# Patient Record
Sex: FEMALE | Race: WHITE | NOT HISPANIC OR LATINO | Employment: PART TIME | URBAN - METROPOLITAN AREA
[De-identification: names, ages, dates, MRNs, and addresses within clinical notes are randomized per-mention and may not be internally consistent; named-entity substitution may affect disease eponyms.]

---

## 2017-01-16 ENCOUNTER — ALLIED HEALTH/NURSE VISIT (OUTPATIENT)
Dept: FAMILY MEDICINE | Facility: CLINIC | Age: 32
End: 2017-01-16
Payer: COMMERCIAL

## 2017-01-16 DIAGNOSIS — Z23 NEED FOR PROPHYLACTIC VACCINATION AND INOCULATION AGAINST INFLUENZA: Primary | ICD-10-CM

## 2017-01-16 PROCEDURE — 99207 ZZC NO CHARGE NURSE ONLY: CPT

## 2017-01-16 PROCEDURE — 90686 IIV4 VACC NO PRSV 0.5 ML IM: CPT

## 2017-01-16 PROCEDURE — 90471 IMMUNIZATION ADMIN: CPT

## 2017-01-16 NOTE — PROGRESS NOTES
Injectable Influenza Immunization Documentation    1.  Is the person to be vaccinated sick today?  No    2. Does the person to be vaccinated have an allergy to eggs or to a component of the vaccine?  No    3. Has the person to be vaccinated today ever had a serious reaction to influenza vaccine in the past?  No    4. Has the person to be vaccinated ever had Guillain-Matamoras syndrome?  No     Form completed by Manny Contreras CMA

## 2017-07-07 ENCOUNTER — TELEPHONE (OUTPATIENT)
Dept: FAMILY MEDICINE | Facility: CLINIC | Age: 32
End: 2017-07-07

## 2017-07-07 NOTE — TELEPHONE ENCOUNTER
S-(situation): Pt describes that she was applying an anti-aging product 2 days ago when she got some in her eye.  Her eye was burning and stinging afterward.  However, yesterday, her eye watered all day so badly that she could not see well enough to drive and her eye was light-sensitive.  Today, her eye is worse.  The eye is red and swollen.  It is a throbbing pain now.  The eye is more light-sensitive today and is draining increased amounts of watery drainage.    B-(background): per above.    A-(assessment): chemical eye injury    R-(recommendations): Advised eye dr asap.  Pt will arrange.  Warned that this is her eye/vision and important that this is addressed asap today.  Mary Chandra RN

## 2017-10-02 DIAGNOSIS — F41.1 GAD (GENERALIZED ANXIETY DISORDER): ICD-10-CM

## 2017-10-02 RX ORDER — BUPROPION HYDROCHLORIDE 150 MG/1
150 TABLET ORAL EVERY MORNING
Qty: 30 TABLET | Refills: 0 | Status: SHIPPED | OUTPATIENT
Start: 2017-10-02 | End: 2017-11-27

## 2017-10-02 NOTE — TELEPHONE ENCOUNTER
buPROPion (WELLBUTRIN XL) 150 MG 24 hr tablet       Last Written Prescription Date: 8/25/16  Last Fill Quantity: 90; # refills: 3  Last Office Visit with [facility], P or [facility] prescribing provider:  10/19/16        Last PHQ-9 score on record=   PHQ-9 SCORE 10/19/2016   Total Score 6       No results found for: AST  No results found for: ALT

## 2017-11-06 DIAGNOSIS — F41.1 GAD (GENERALIZED ANXIETY DISORDER): ICD-10-CM

## 2017-11-08 NOTE — TELEPHONE ENCOUNTER
Routing refill request to provider for review/approval because:  Patient needs to be seen because it has been more than 1 year since last office visit.  Pato Hart RN

## 2017-11-14 RX ORDER — BUPROPION HYDROCHLORIDE 150 MG/1
TABLET ORAL
Qty: 30 TABLET | Refills: 0 | OUTPATIENT
Start: 2017-11-14

## 2017-11-27 ENCOUNTER — OFFICE VISIT (OUTPATIENT)
Dept: FAMILY MEDICINE | Facility: CLINIC | Age: 32
End: 2017-11-27
Payer: COMMERCIAL

## 2017-11-27 VITALS
HEIGHT: 69 IN | WEIGHT: 137.9 LBS | SYSTOLIC BLOOD PRESSURE: 114 MMHG | DIASTOLIC BLOOD PRESSURE: 79 MMHG | TEMPERATURE: 98.7 F | HEART RATE: 90 BPM | BODY MASS INDEX: 20.42 KG/M2

## 2017-11-27 DIAGNOSIS — F41.1 GAD (GENERALIZED ANXIETY DISORDER): ICD-10-CM

## 2017-11-27 DIAGNOSIS — F32.81 PMDD (PREMENSTRUAL DYSPHORIC DISORDER): ICD-10-CM

## 2017-11-27 DIAGNOSIS — Z30.9 ENCOUNTER FOR CONTRACEPTIVE MANAGEMENT, UNSPECIFIED TYPE: ICD-10-CM

## 2017-11-27 DIAGNOSIS — N92.0 EXCESSIVE OR FREQUENT MENSTRUATION: Primary | ICD-10-CM

## 2017-11-27 DIAGNOSIS — Z12.4 SCREENING FOR CERVICAL CANCER: ICD-10-CM

## 2017-11-27 DIAGNOSIS — N89.8 VAGINAL DISCHARGE: ICD-10-CM

## 2017-11-27 LAB
SPECIMEN SOURCE: NORMAL
WET PREP SPEC: NORMAL

## 2017-11-27 PROCEDURE — 87624 HPV HI-RISK TYP POOLED RSLT: CPT | Performed by: PHYSICIAN ASSISTANT

## 2017-11-27 PROCEDURE — 87210 SMEAR WET MOUNT SALINE/INK: CPT | Performed by: PHYSICIAN ASSISTANT

## 2017-11-27 PROCEDURE — G0145 SCR C/V CYTO,THINLAYER,RESCR: HCPCS | Performed by: PHYSICIAN ASSISTANT

## 2017-11-27 PROCEDURE — 99214 OFFICE O/P EST MOD 30 MIN: CPT | Performed by: PHYSICIAN ASSISTANT

## 2017-11-27 RX ORDER — BUPROPION HYDROCHLORIDE 150 MG/1
150 TABLET ORAL EVERY MORNING
Qty: 90 TABLET | Refills: 1 | Status: SHIPPED | OUTPATIENT
Start: 2017-11-27 | End: 2018-06-13

## 2017-11-27 RX ORDER — SERTRALINE HYDROCHLORIDE 100 MG/1
150 TABLET, FILM COATED ORAL DAILY
Qty: 135 TABLET | Refills: 1 | Status: SHIPPED | OUTPATIENT
Start: 2017-11-27 | End: 2018-06-13

## 2017-11-27 ASSESSMENT — ANXIETY QUESTIONNAIRES
7. FEELING AFRAID AS IF SOMETHING AWFUL MIGHT HAPPEN: NOT AT ALL
3. WORRYING TOO MUCH ABOUT DIFFERENT THINGS: SEVERAL DAYS
5. BEING SO RESTLESS THAT IT IS HARD TO SIT STILL: SEVERAL DAYS
2. NOT BEING ABLE TO STOP OR CONTROL WORRYING: SEVERAL DAYS
1. FEELING NERVOUS, ANXIOUS, OR ON EDGE: SEVERAL DAYS
GAD7 TOTAL SCORE: 7
6. BECOMING EASILY ANNOYED OR IRRITABLE: MORE THAN HALF THE DAYS

## 2017-11-27 ASSESSMENT — PATIENT HEALTH QUESTIONNAIRE - PHQ9
SUM OF ALL RESPONSES TO PHQ QUESTIONS 1-9: 7
5. POOR APPETITE OR OVEREATING: SEVERAL DAYS

## 2017-11-27 NOTE — PATIENT INSTRUCTIONS
Increase zoloft to 150 mg daily  Continue wellbutrin 150 mg daily  Make appointment for OBGYN to discuss birth control/period control options  Let me know how the medication changes go

## 2017-11-27 NOTE — LETTER
December 3, 2017    Jeremiah Madsen  [address] [facility] [address] [address]    Dear Jeremiah,  We are happy to inform you that your PAP smear result from 11/27/17 is normal.  We are now able to do a follow up test on PAP smears. The DNA test is for HPV (Human Papilloma Virus). Cervical cancer is closely linked with certain types of HPV. Your result showed no evidence of high risk HPV.  Therefore we recommend you return in 3 years for your next pap smear and HPV test.  You will still need to return to the clinic every year for an annual exam and other preventive tests.  Please contact the clinic at [phone] with any questions.  Sincerely,    Sheba Balderas PA-C/abena

## 2017-11-27 NOTE — PROGRESS NOTES
SUBJECTIVE:                                                    Jeremiah Madsen is a 32 year old female who presents to clinic today for the following health issues:    Medication Followup of Zoloft and Wellbutrin    Taking Medication as prescribed: yes    Side Effects:  None    Medication Helping Symptoms:  Talk about different options zoloft she does take 100 mg daily but during periods she takes 150mg.       * she is okay doing the PAP smear.  Has had paragard IUD almost 6 years  Has heavy periods and bad PMS  She feels her main issue is PMDD, increasing zoloft to 150 mg period week does not help because she has worsening mood/anxiety the week before too.  Her periods are regular but are heavier with paragard  She is not ready for permanent contraception    Hypertension: no  Smoking/tobacco use: no  History of cancer: no  History of heart problems or blood clots: no  History of migraines with aura: no  Other PMH: none  LMP: 11/13/17  STD testing: declined      Problem list and histories reviewed & adjusted, as indicated.  Additional history: none    Patient Active Problem List   Diagnosis     BLAZE (generalized anxiety disorder)     PMDD (premenstrual dysphoric disorder)     Past Surgical History:   Procedure Laterality Date     breast lift/implants  09/2014       Social History   Substance Use Topics     Smoking status: Never Smoker     Smokeless tobacco: Never Used     Alcohol use 0.0 oz/week     0 Standard drinks or equivalent per week      Comment: occasional      Family History   Problem Relation Age of Onset     OSTEOPOROSIS Mother      Depression Father      CANCER Father      CANCER Maternal Grandmother      CANCER Maternal Grandfather      CEREBROVASCULAR DISEASE Maternal Grandfather      CANCER Paternal Grandmother      Colon Cancer Paternal Grandmother      Depression Brother              ROS:Other than noted above, general, HEENT, respiratory, cardiac, MS, and gastrointestinal systems are negative.  "    OBJECTIVE:                                                    /79  Pulse 90  Temp 98.7  F (37.1  C) (Tympanic)  Ht 5' 9\" (1.753 m)  Wt 137 lb 14.4 oz (62.6 kg)  LMP 11/13/2017 (Exact Date)  BMI 20.36 kg/m2 Body mass index is 20.36 kg/(m^2).   GENERAL: healthy, alert, well nourished, well hydrated, no distress  RESP: lungs clear to auscultation - no rales, no rhonchi, no wheezes  CV: regular rates and rhythm, normal S1 S2, no S3 or S4 and no murmur, no click or rub -  ABDOMEN: soft, no tenderness, no  hepatosplenomegaly, no masses, normal bowel sounds  - female: cervix- normal, adnexae- normal; uterus- normal, no masses, mild whitish discharge. IUD strings visualized  PSYCH: Alert and oriented times 3; speech- coherent , normal rate and volume; able to articulate logical thoughts, able to abstract reason, no tangential thoughts, no hallucinations or delusions, affect- normal       ASSESSMENT/PLAN:                                                      ASSESSMENT/PLAN:      ICD-10-CM    1. Excessive or frequent menstruation N92.0 OB/GYN REFERRAL   2. Encounter for contraceptive management, unspecified type Z30.9 OB/GYN REFERRAL   3. BLAZE (generalized anxiety disorder) F41.1 sertraline (ZOLOFT) 100 MG tablet     buPROPion (WELLBUTRIN XL) 150 MG 24 hr tablet   4. PMDD (premenstrual dysphoric disorder) F32.81 OB/GYN REFERRAL   5. Screening for cervical cancer Z12.4 Pap imaged thin layer screen with HPV - recommended age 30 - 65 years (select HPV order below)   6. Vaginal discharge N89.8 Wet prep     She would like to discuss options with OBGYN; have paragard removed, possible mirena    Patient Instructions   Increase zoloft to 150 mg daily  Continue wellbutrin 150 mg daily  Make appointment for OBGYN to discuss birth control/period control options  Let me know how the medication changes go    Sheba Balderas PA-C   [facility]    "

## 2017-11-27 NOTE — MR AVS SNAPSHOT
After Visit Summary   11/27/2017    Jeremiah Madsen    MRN: [id number]           Patient Information     Date Of Birth          1985        Visit Information        Provider Department      11/27/2017 1:40 PM Sheba Balderas PA-C [facility]        Today's Diagnoses     Excessive or frequent menstruation    -  1    Encounter for contraceptive management, unspecified type        BLAZE (generalized anxiety disorder)        PMDD (premenstrual dysphoric disorder)          Care Instructions    Increase zoloft to 150 mg daily  Continue wellbutrin 150 mg daily  Make appointment for OBGYN to discuss birth control/period control options  Let me know how the medication changes go          Follow-ups after your visit        Additional Services     OB/GYN REFERRAL       Your provider has referred you to:  [facility]: [facility] [place] [phone]   [email or web address]    Please be aware that coverage of these services is subject to the terms and limitations of your health insurance plan.  Call member services at your health plan with any benefit or coverage questions.      Please bring the following with you to your appointment:    (1) Any X-Rays, CTs or MRIs which have been performed.  Contact the facility where they were done to arrange for  prior to your scheduled appointment.   (2) List of current medications   (3) This referral request   (4) Any documents/labs given to you for this referral                  Who to contact     Normal or non-critical lab and imaging results will be communicated to you by MyChart, letter or phone within 4 business days after the clinic has received the results. If you do not hear from us within 7 days, please contact the clinic through MyChart or phone. If you have a critical or abnormal lab result, we will notify you by phone as soon as possible.  Submit refill requests through [employer] or call your pharmacy and they will  "forward the refill request to us. Please allow 3 business days for your refill to be completed.          If you need to speak with a  for additional information , please call: [phone]             Additional Information About Your Visit        [employer] Information     [employer] lets you send messages to your doctor, view your test results, renew your prescriptions, schedule appointments and more. To sign up, go to [email or web address] . Click on \"Log in\" on the left side of the screen, which will take you to the Welcome page. Then click on \"Sign up Now\" on the right side of the page.     You will be asked to enter the access code listed below, as well as some personal information. Please follow the directions to create your username and password.     Your access code is: [email or web address]  Expires: 2018  2:25 PM     Your access code will  in 90 days. If you need help or a new code, please call your [facility] clinic or [phone].        Care EveryWhere ID     This is your Care EveryWhere ID. This could be used by other organizations to access your [facility] medical records  [id number]        Your Vitals Were     Pulse Temperature Height Last Period BMI (Body Mass Index)       90 98.7  F (37.1  C) (Tympanic) 5' 9\" (1.753 m) 2017 (Exact Date) 20.36 kg/m2        Blood Pressure from Last 3 Encounters:   17 114/79   10/19/16 114/77   16 131/68    Weight from Last 3 Encounters:   17 137 lb 14.4 oz (62.6 kg)   10/19/16 132 lb 9.6 oz (60.1 kg)   16 135 lb 11.2 oz (61.6 kg)              We Performed the Following     OB/GYN REFERRAL          Today's Medication Changes          These changes are accurate as of: 17  2:25 PM.  If you have any questions, ask your nurse or doctor.               These medicines have changed or have updated prescriptions.        Dose/Directions    buPROPion 150 MG 24 hr tablet   Commonly known as:  WELLBUTRIN XL   This may have " changed:  additional instructions   Used for:  BLAZE (generalized anxiety disorder)   Changed by:  Sheba Balderas PA-C        Dose:  150 mg   Take 1 tablet (150 mg) by mouth every morning   Quantity:  90 tablet   Refills:  1       * sertraline 100 MG tablet   Commonly known as:  ZOLOFT   This may have changed:  Another medication with the same name was added. Make sure you understand how and when to take each.   Used for:  BLAZE (generalized anxiety disorder)   Changed by:  Maddie Cho MD        Take 1 tablet (100 mg) by mouth daily - for one week out of the month, take 150mg ( PMDD). Due for follow up in clinic   Quantity:  135 tablet   Refills:  0       * sertraline 100 MG tablet   Commonly known as:  ZOLOFT   This may have changed:  You were already taking a medication with the same name, and this prescription was added. Make sure you understand how and when to take each.   Used for:  BLAZE (generalized anxiety disorder)   Changed by:  Sheba Balderas PA-C        Dose:  150 mg   Take 1.5 tablets (150 mg) by mouth daily   Quantity:  135 tablet   Refills:  1       * Notice:  This list has 2 medication(s) that are the same as other medications prescribed for you. Read the directions carefully, and ask your doctor or other care provider to review them with you.         Where to get your medicines      These medications were sent to [id number] - [place], [place] - [address] [place] [address] AT [facility] [address]  [address] [place] [address], [place] [address]     Phone:  [phone]     buPROPion 150 MG 24 hr tablet    sertraline 100 MG tablet                Primary Care Provider Office Phone # Fax #    Sheba Balderas PA-C [phone] [id number] [facility] [address]        Equal Access to Services     ULI PLASCENCIA AH: Jak Vincent, chnia castrejon, jayantta kaaljulian gomez, ying lomas. So [facility]  [phone].    ATENCIÓN: Si aracelis hernandez, tiene a doran disposición servicios gratuitos de asistencia lingüística. Yamilka ya [phone].    We comply with applicable federal civil rights laws and [place] laws. We do not discriminate on the basis of race, color, national origin, age, disability, sex, sexual orientation, or gender identity.            Thank you!     Thank you for choosing [facility]  for your care. Our goal is always to provide you with excellent care. Hearing back from our patients is one way we can continue to improve our services. Please take a few minutes to complete the written survey that you may receive in the mail after your visit with us. Thank you!             Your Updated Medication List - Protect others around you: Learn how to safely use, store and throw away your medicines at www.disposemymeds.org.          This list is accurate as of: 11/27/17  2:25 PM.  Always use your most recent med list.                   Brand Name Dispense Instructions for use Diagnosis    buPROPion 150 MG 24 hr tablet    WELLBUTRIN XL    90 tablet    Take 1 tablet (150 mg) by mouth every morning    BLAZE (generalized anxiety disorder)       hydrOXYzine 10 MG tablet    ATARAX    60 tablet    Take 1-3 tablets (10-30 mg) by mouth 3 times daily as needed for itching or anxiety    BLAZE (generalized anxiety disorder)       paragard intrauterine copper     1 each    1 each by Intrauterine route once        * sertraline 100 MG tablet    ZOLOFT    135 tablet    Take 1 tablet (100 mg) by mouth daily - for one week out of the month, take 150mg ( PMDD). Due for follow up in clinic    BLAZE (generalized anxiety disorder)       * sertraline 100 MG tablet    ZOLOFT    135 tablet    Take 1.5 tablets (150 mg) by mouth daily    BLAZE (generalized anxiety disorder)       * Notice:  This list has 2 medication(s) that are the same as other medications prescribed for you. Read the directions carefully, and ask your doctor or  other care provider to review them with you.

## 2017-11-28 ASSESSMENT — ANXIETY QUESTIONNAIRES: GAD7 TOTAL SCORE: 7

## 2017-11-29 LAB
COPATH REPORT: NORMAL
PAP: NORMAL

## 2017-12-01 LAB
FINAL DIAGNOSIS: NORMAL
HPV HR 12 DNA CVX QL NAA+PROBE: NEGATIVE
HPV16 DNA SPEC QL NAA+PROBE: NEGATIVE
HPV18 DNA SPEC QL NAA+PROBE: NEGATIVE
SPECIMEN DESCRIPTION: NORMAL

## 2018-06-13 DIAGNOSIS — F41.1 GAD (GENERALIZED ANXIETY DISORDER): ICD-10-CM

## 2018-06-13 RX ORDER — SERTRALINE HYDROCHLORIDE 100 MG/1
TABLET, FILM COATED ORAL
Qty: 45 TABLET | Refills: 0 | Status: SHIPPED | OUTPATIENT
Start: 2018-06-13 | End: 2018-07-23

## 2018-06-13 RX ORDER — BUPROPION HYDROCHLORIDE 150 MG/1
TABLET ORAL
Qty: 30 TABLET | Refills: 0 | Status: SHIPPED | OUTPATIENT
Start: 2018-06-13 | End: 2018-07-23

## 2018-06-13 NOTE — TELEPHONE ENCOUNTER
Medication is being filled for 1 time refill only due to:  Patient needs to be seen because needs follow up appt and phq9.   Pato Hart RN

## 2018-07-23 DIAGNOSIS — F41.1 GAD (GENERALIZED ANXIETY DISORDER): ICD-10-CM

## 2018-07-23 NOTE — TELEPHONE ENCOUNTER
"SERTRALINE 100MG TABLETS        Last Written Prescription Date:  6/13/18  Last Fill Quantity: 45,   # refills: 0  Last Office Visit: 11/27/17  Future Office visit:       buPROPion (WELLBUTRIN XL) 150 MG 24 hr tablet      Last Written Prescription Date:  6/13/18  Last Fill Quantity: 30,   # refills: 0  Last Office Visit: 11/27/17  Future Office visit:       Requested Prescriptions   Pending Prescriptions Disp Refills     sertraline (ZOLOFT) 100 MG tablet [Pharmacy Med Name: SERTRALINE 100MG TABLETS] 45 tablet 0     Sig: TAKE 1& 1/2 TABLETS BY MOUTH EVERY DAY    SSRIs Protocol Passed    7/23/2018 10:31 AM       Passed - Recent (12 mo) or future (30 days) visit within the authorizing provider's specialty    Patient had office visit in the last 12 months or has a visit in the next 30 days with authorizing provider or within the authorizing provider's specialty.  See \"Patient Info\" tab in [employer], or \"Choose Columns\" in Meds & Orders section of the refill encounter.           Passed - Medication is Bupropion    If the medication is Bupropion (Wellbutrin), and the patient is taking for smoking cessation; OK to refill.         Passed - Patient is age 18 or older       Passed - No active pregnancy on record       Passed - No positive pregnancy test in last 12 months        buPROPion (WELLBUTRIN XL) 150 MG 24 hr tablet [Pharmacy Med Name: BUPROPION XL 150MG TABLETS (24 H)] 30 tablet 0     Sig: TAKE 1 TABLET BY MOUTH EVERY MORNING    SSRIs Protocol Passed    7/23/2018 10:31 AM       Passed - Recent (12 mo) or future (30 days) visit within the authorizing provider's specialty    Patient had office visit in the last 12 months or has a visit in the next 30 days with authorizing provider or within the authorizing provider's specialty.  See \"Patient Info\" tab in inbasket, or \"Choose Columns\" in Meds & Orders section of the refill encounter.           Passed - Medication is Bupropion    If the medication is Bupropion (Wellbutrin), " and the patient is taking for smoking cessation; OK to refill.         Passed - Patient is age 18 or older       Passed - No active pregnancy on record       Passed - No positive pregnancy test in last 12 months

## 2018-07-24 RX ORDER — BUPROPION HYDROCHLORIDE 150 MG/1
TABLET ORAL
Qty: 30 TABLET | Refills: 3 | Status: SHIPPED | OUTPATIENT
Start: 2018-07-24 | End: 2018-11-26

## 2018-07-24 RX ORDER — SERTRALINE HYDROCHLORIDE 100 MG/1
TABLET, FILM COATED ORAL
Qty: 45 TABLET | Refills: 3 | Status: SHIPPED | OUTPATIENT
Start: 2018-07-24 | End: 2019-01-07

## 2018-08-23 ENCOUNTER — NURSE TRIAGE (OUTPATIENT)
Dept: NURSING | Facility: CLINIC | Age: 33
End: 2018-08-23

## 2018-08-23 NOTE — TELEPHONE ENCOUNTER
Patient has had a cough for about one week.  Patient says she started having a fever since last night.  She woke up sweating.  Current temp is 100.4 axillary.  Reviewed with call guideline and care advice.  Caller verbalizes understanding.         Additional Information    Negative: Difficulty breathing    Negative: [1] Headache AND [2] stiff neck (can't touch chin to chest)    Negative: IV drug abuse    Negative: [1] Drinking very little AND [2] dehydration suspected (e.g., no urine > 12 hours, very dry mouth, very lightheaded)    Negative: Patient sounds very sick or weak to the triager  (Exception: mild weakness and hasn't taken fever medicine)    Negative: Fever > 104 F (40 C)    Negative: [1] Fever > 101 F (38.3 C) AND [2] age > 60    Negative: [1] Fever > 101 F (38.3 C) AND [2] bedridden (e.g., nursing home patient, CVA, chronic illness, recovering from surgery)    Negative: [1] Fever > 100.5 F (38.1 C) AND [2] indwelling urinary catheter (e.g., Pandey, Coude)    Negative: [1] Fever > 100.5 F (38.1 C) AND [2] has port (portacath), central line, or PICC line    Negative: [1] Fever > 100.5 F (38.1 C) AND [2] diabetes mellitus or weak immune system (e.g., HIV positive, splenectomy, chronic steroids)    Negative: [1] Fever > 100.5 F (38.1 C) AND [2] surgery in the last month    Negative: Transplant patient (e.g., kidney, liver, lung, heart)    Negative: Fever present > 3 days (72 hours)    Negative: [1] Fever > 100.5 F (38.1 C) AND [2] foreign travel to a developing country in the last month    Negative: [1] Intermittent fever > 100.5 F (38.1 C) AND [2] lasts > 3 weeks    [1] Fever AND [2] no signs of serious infection or localizing symptoms (all other triage questions negative)    Protocols used: FEVER-ADULT-

## 2018-11-26 DIAGNOSIS — F41.1 GAD (GENERALIZED ANXIETY DISORDER): ICD-10-CM

## 2018-11-26 RX ORDER — BUPROPION HYDROCHLORIDE 150 MG/1
TABLET ORAL
Qty: 30 TABLET | Refills: 0 | Status: SHIPPED | OUTPATIENT
Start: 2018-11-26 | End: 2019-01-07

## 2018-11-26 NOTE — TELEPHONE ENCOUNTER
Medication is being filled for 1 time refill only due to:  Patient needs to be seen because it has been one year.   Pato Hart RN

## 2018-11-26 NOTE — TELEPHONE ENCOUNTER
"BUPROPION XL 150MG TABLETS (24 H)        Last Written Prescription Date:  7/24/18  Last Fill Quantity: 30,   # refills: 3  Last Office Visit: 8/24/18  Future Office visit:       Requested Prescriptions   Pending Prescriptions Disp Refills     buPROPion (WELLBUTRIN XL) 150 MG 24 hr tablet [Pharmacy Med Name: BUPROPION XL 150MG TABLETS (24 H)] 30 tablet 0     Sig: TAKE 1 TABLET BY MOUTH EVERY MORNING    SSRIs Protocol Passed    11/26/2018  3:24 AM       Passed - Recent (12 mo) or future (30 days) visit within the authorizing provider's specialty    Patient had office visit in the last 12 months or has a visit in the next 30 days with authorizing provider or within the authorizing provider's specialty.  See \"Patient Info\" tab in inbasket, or \"Choose Columns\" in Meds & Orders section of the refill encounter.             Passed - Medication is Bupropion    If the medication is Bupropion (Wellbutrin), and the patient is taking for smoking cessation; OK to refill.         Passed - Patient is age 18 or older       Passed - No active pregnancy on record       Passed - No positive pregnancy test in last 12 months          "

## 2019-01-07 DIAGNOSIS — F41.1 GAD (GENERALIZED ANXIETY DISORDER): ICD-10-CM

## 2019-01-07 NOTE — TELEPHONE ENCOUNTER
"BUPROPION XL 150MG TABLETS (24 H)      Last Written Prescription Date:  11/26/18  Last Fill Quantity: 30,   # refills: 0  Last Office Visit: 8/24/18  Future Office visit:       sertraline (ZOLOFT) 100 MG tablet      Last Written Prescription Date:  7/24/18  Last Fill Quantity: 45,   # refills: 3  Last Office Visit: 8/24/18  Future Office visit:       Requested Prescriptions   Pending Prescriptions Disp Refills     buPROPion (WELLBUTRIN XL) 150 MG 24 hr tablet [Pharmacy Med Name: BUPROPION XL 150MG TABLETS (24 H)] 30 tablet 0     Sig: TAKE 1 TABLET BY MOUTH EVERY MORNING    SSRIs Protocol Failed - 1/7/2019  4:00 PM       Failed - Recent (12 mo) or future (30 days) visit within the authorizing provider's specialty    Patient had office visit in the last 12 months or has a visit in the next 30 days with authorizing provider or within the authorizing provider's specialty.  See \"Patient Info\" tab in [employer], or \"Choose Columns\" in Meds & Orders section of the refill encounter.             Passed - Medication is Bupropion    If the medication is Bupropion (Wellbutrin), and the patient is taking for smoking cessation; OK to refill.         Passed - Medication is active on med list       Passed - Patient is age 18 or older       Passed - No active pregnancy on record       Passed - No positive pregnancy test in last 12 months        sertraline (ZOLOFT) 100 MG tablet [Pharmacy Med Name: SERTRALINE 100MG TABLETS] 45 tablet 0     Sig: TAKE 1& 1/2 TABLETS BY MOUTH EVERY DAY    SSRIs Protocol Failed - 1/7/2019  4:00 PM       Failed - Recent (12 mo) or future (30 days) visit within the authorizing provider's specialty    Patient had office visit in the last 12 months or has a visit in the next 30 days with authorizing provider or within the authorizing provider's specialty.  See \"Patient Info\" tab in [employer], or \"Choose Columns\" in Meds & Orders section of the refill encounter.             Passed - Medication is Bupropion    If " the medication is Bupropion (Wellbutrin), and the patient is taking for smoking cessation; OK to refill.         Passed - Medication is active on med list       Passed - Patient is age 18 or older       Passed - No active pregnancy on record       Passed - No positive pregnancy test in last 12 months

## 2019-01-08 NOTE — TELEPHONE ENCOUNTER
Routing refill request to provider for review/approval because:  Savita given x1 and patient did not follow up  Patient needs to be seen because:    PHQ-9 SCORE 8/25/2016 10/19/2016 11/27/2017   PHQ-9 Total Score 5 6 7       Patient needs to be seen because it has been more than 1 year since last office visit. 11/27/17  Pato Hart RN

## 2019-01-09 NOTE — TELEPHONE ENCOUNTER
Message left on patient's personally identified vm to call the clinic at [phone] and make an appointment before further refills can be made.  Pato Hart RN

## 2019-01-09 NOTE — TELEPHONE ENCOUNTER
Please call patient to schedule office visit med check. Okay to refill to get her through to appointment  Sheba Balderas PA-C

## 2019-01-14 RX ORDER — SERTRALINE HYDROCHLORIDE 100 MG/1
TABLET, FILM COATED ORAL
Qty: 11 TABLET | Refills: 0 | Status: SHIPPED | OUTPATIENT
Start: 2019-01-14

## 2019-01-14 RX ORDER — BUPROPION HYDROCHLORIDE 150 MG/1
TABLET ORAL
Qty: 7 TABLET | Refills: 0 | Status: SHIPPED | OUTPATIENT
Start: 2019-01-14 | End: 2023-05-31

## 2020-09-24 ENCOUNTER — TRANSFERRED RECORDS (OUTPATIENT)
Dept: HEALTH INFORMATION MANAGEMENT | Facility: CLINIC | Age: 35
End: 2020-09-24

## 2020-09-24 LAB
CHOLESTEROL (EXTERNAL): 227 MG/DL
HBA1C MFR BLD: 4.7 %
HDLC SERPL-MCNC: 84 MG/DL
HPV ABSTRACT: NORMAL
LDL CHOLESTEROL CALCULATED (EXTERNAL): 125 MG/DL
NON HDL CHOLESTEROL (EXTERNAL): ABNORMAL MG/DL
PAP-ABSTRACT: NORMAL
TRIGLYCERIDES (EXTERNAL): 92 MG/DL

## 2021-02-25 ENCOUNTER — COMMUNICATION - HEALTHEAST (OUTPATIENT)
Dept: HEALTH INFORMATION MANAGEMENT | Facility: CLINIC | Age: 36
End: 2021-02-25

## 2021-06-26 ENCOUNTER — HEALTH MAINTENANCE LETTER (OUTPATIENT)
Age: 36
End: 2021-06-26

## 2021-10-16 ENCOUNTER — HEALTH MAINTENANCE LETTER (OUTPATIENT)
Age: 36
End: 2021-10-16

## 2021-12-30 ENCOUNTER — TRANSFERRED RECORDS (OUTPATIENT)
Dept: HEALTH INFORMATION MANAGEMENT | Facility: CLINIC | Age: 36
End: 2021-12-30

## 2022-05-05 NOTE — TELEPHONE ENCOUNTER
Ordered one week's worth with instructions to make an appt before further refills can be made.  Pato Hart RN     Detail Level: Detailed Quality 130: Documentation Of Current Medications In The Medical Record: Current Medications Documented

## 2022-07-23 ENCOUNTER — HEALTH MAINTENANCE LETTER (OUTPATIENT)
Age: 37
End: 2022-07-23

## 2022-09-22 ENCOUNTER — MYC REFILL (OUTPATIENT)
Dept: FAMILY MEDICINE | Facility: CLINIC | Age: 37
End: 2022-09-22

## 2022-09-22 DIAGNOSIS — F41.1 GAD (GENERALIZED ANXIETY DISORDER): ICD-10-CM

## 2022-09-22 RX ORDER — BUPROPION HYDROCHLORIDE 150 MG/1
150 TABLET ORAL EVERY MORNING
Qty: 7 TABLET | Refills: 0 | OUTPATIENT
Start: 2022-09-22

## 2022-09-25 ENCOUNTER — HEALTH MAINTENANCE LETTER (OUTPATIENT)
Age: 37
End: 2022-09-25

## 2023-02-08 ENCOUNTER — VIRTUAL VISIT (OUTPATIENT)
Dept: FAMILY MEDICINE | Facility: CLINIC | Age: 38
End: 2023-02-08

## 2023-02-08 DIAGNOSIS — F41.1 GENERALIZED ANXIETY DISORDER: Primary | ICD-10-CM

## 2023-02-08 PROCEDURE — 99203 OFFICE O/P NEW LOW 30 MIN: CPT | Mod: VID | Performed by: FAMILY MEDICINE

## 2023-02-08 RX ORDER — DROSPIRENONE AND ETHINYL ESTRADIOL 0.02-3(28)
1 KIT ORAL DAILY
COMMUNITY
End: 2023-03-14

## 2023-02-08 RX ORDER — BUPROPION HYDROCHLORIDE 150 MG/1
150 TABLET ORAL EVERY MORNING
Qty: 30 TABLET | Refills: 0 | Status: SHIPPED | OUTPATIENT
Start: 2023-02-08 | End: 2023-03-14

## 2023-02-08 RX ORDER — SERTRALINE HYDROCHLORIDE 100 MG/1
100 TABLET, FILM COATED ORAL DAILY
Qty: 30 TABLET | Refills: 0 | Status: SHIPPED | OUTPATIENT
Start: 2023-02-08 | End: 2023-02-20

## 2023-02-08 NOTE — PROGRESS NOTES
Greg is a 37 year old who is being evaluated via a billable video visit.      How would you like to obtain your AVS? MyChart  If the video visit is dropped, the invitation should be resent by: Text to cell phone: [phone]  Will anyone else be joining your video visit? No      Assessment & Plan     Generalized anxiety disorder  Patient has had inconsistent follow-up, discussed need for follow-up; including assessing the effect of the medications and symptom assessment, as well as assessing overall health.  We will do 1 month prescription without refill and to follow up in 1 month or sooner with concerns.  - buPROPion (WELLBUTRIN XL) 150 MG 24 hr tablet; Take 1 tablet (150 mg) by mouth every morning  - sertraline (ZOLOFT) 100 MG tablet; Take 1 tablet (100 mg) by mouth daily      Return in about 1 month (around 3/8/2023) for Follow up with PCP.    Glynn Huerta MD  [facility]    Josse Garza is a 37 year old, presenting for the following health issues:  Recheck Medication  Patient wants refill for Zoloft and Wellbutrin.  Reviewed history shows she has not had follow-up for a while.  She says her insurance changed.  Last refill was in September last year for 30 pills of Zoloft; understands Wellbutrin as well although she does not have the bottle to show me.    Bottles:   Zoloft 100 mg for 30 days, no refills  Wellbutrim 150 mg; has no medication bottle at this time    She states she needs the medication because her  states that she is a little on the edge when she does not take the medication.     HPI Review of Systems         Objective           Vitals:  No vitals were obtained today due to virtual visit.    Physical Exam         Video-Visit Details    Type of service:  Video Visit     Originating Location (pt. Location): Home     Distant Location (provider location):  On-site    Platform used for Video Visit: "[employer]

## 2023-02-20 DIAGNOSIS — F41.1 GENERALIZED ANXIETY DISORDER: ICD-10-CM

## 2023-02-20 RX ORDER — SERTRALINE HYDROCHLORIDE 100 MG/1
TABLET, FILM COATED ORAL
Qty: 30 TABLET | Refills: 0 | Status: SHIPPED | OUTPATIENT
Start: 2023-02-20 | End: 2023-03-14

## 2023-03-14 ENCOUNTER — OFFICE VISIT (OUTPATIENT)
Dept: FAMILY MEDICINE | Facility: CLINIC | Age: 38
End: 2023-03-14
Payer: COMMERCIAL

## 2023-03-14 VITALS
DIASTOLIC BLOOD PRESSURE: 86 MMHG | SYSTOLIC BLOOD PRESSURE: 138 MMHG | BODY MASS INDEX: 21.37 KG/M2 | HEIGHT: 69 IN | RESPIRATION RATE: 16 BRPM | WEIGHT: 144.3 LBS | OXYGEN SATURATION: 100 % | HEART RATE: 68 BPM | TEMPERATURE: 97.9 F

## 2023-03-14 DIAGNOSIS — Z12.4 CERVICAL CANCER SCREENING: ICD-10-CM

## 2023-03-14 DIAGNOSIS — Z00.00 ROUTINE GENERAL MEDICAL EXAMINATION AT A HEALTH CARE FACILITY: Primary | ICD-10-CM

## 2023-03-14 DIAGNOSIS — Z12.31 ENCOUNTER FOR SCREENING MAMMOGRAM FOR BREAST CANCER: ICD-10-CM

## 2023-03-14 DIAGNOSIS — F41.1 GENERALIZED ANXIETY DISORDER: ICD-10-CM

## 2023-03-14 DIAGNOSIS — Z13.29 SCREENING FOR THYROID DISORDER: ICD-10-CM

## 2023-03-14 DIAGNOSIS — Z30.011 ENCOUNTER FOR PRESCRIPTION OF ORAL CONTRACEPTIVES: ICD-10-CM

## 2023-03-14 DIAGNOSIS — F32.81 PMDD (PREMENSTRUAL DYSPHORIC DISORDER): ICD-10-CM

## 2023-03-14 LAB
T4 FREE SERPL-MCNC: 0.78 NG/DL (ref 0.9–1.7)
TSH SERPL DL<=0.005 MIU/L-ACNC: 5.28 UIU/ML (ref 0.3–4.2)

## 2023-03-14 PROCEDURE — 90471 IMMUNIZATION ADMIN: CPT

## 2023-03-14 PROCEDURE — 0124A COVID-19 VACCINE BIVALENT BOOSTER 12+ (PFIZER): CPT

## 2023-03-14 PROCEDURE — 84443 ASSAY THYROID STIM HORMONE: CPT

## 2023-03-14 PROCEDURE — 99396 PREV VISIT EST AGE 40-64: CPT | Mod: 25

## 2023-03-14 PROCEDURE — 36415 COLL VENOUS BLD VENIPUNCTURE: CPT

## 2023-03-14 PROCEDURE — 87624 HPV HI-RISK TYP POOLED RSLT: CPT

## 2023-03-14 PROCEDURE — 99214 OFFICE O/P EST MOD 30 MIN: CPT | Mod: 25

## 2023-03-14 PROCEDURE — G0145 SCR C/V CYTO,THINLAYER,RESCR: HCPCS

## 2023-03-14 PROCEDURE — 90715 TDAP VACCINE 7 YRS/> IM: CPT

## 2023-03-14 PROCEDURE — 84439 ASSAY OF FREE THYROXINE: CPT

## 2023-03-14 PROCEDURE — 91312 COVID-19 VACCINE BIVALENT BOOSTER 12+ (PFIZER): CPT

## 2023-03-14 RX ORDER — BUPROPION HYDROCHLORIDE 150 MG/1
150 TABLET ORAL EVERY MORNING
Qty: 90 TABLET | Refills: 3 | Status: SHIPPED | OUTPATIENT
Start: 2023-03-14

## 2023-03-14 RX ORDER — DROSPIRENONE AND ETHINYL ESTRADIOL 0.02-3(28)
1 KIT ORAL DAILY
Qty: 84 TABLET | Refills: 3 | Status: SHIPPED | OUTPATIENT
Start: 2023-03-14 | End: 2023-06-01

## 2023-03-14 RX ORDER — SERTRALINE HYDROCHLORIDE 100 MG/1
100 TABLET, FILM COATED ORAL DAILY
Qty: 90 TABLET | Refills: 3 | Status: SHIPPED | OUTPATIENT
Start: 2023-03-14 | End: 2024-03-20

## 2023-03-14 ASSESSMENT — ANXIETY QUESTIONNAIRES
GAD7 TOTAL SCORE: 7
3. WORRYING TOO MUCH ABOUT DIFFERENT THINGS: MORE THAN HALF THE DAYS
5. BEING SO RESTLESS THAT IT IS HARD TO SIT STILL: NOT AT ALL
6. BECOMING EASILY ANNOYED OR IRRITABLE: SEVERAL DAYS
4. TROUBLE RELAXING: NOT AT ALL
7. FEELING AFRAID AS IF SOMETHING AWFUL MIGHT HAPPEN: MORE THAN HALF THE DAYS
IF YOU CHECKED OFF ANY PROBLEMS ON THIS QUESTIONNAIRE, HOW DIFFICULT HAVE THESE PROBLEMS MADE IT FOR YOU TO DO YOUR WORK, TAKE CARE OF THINGS AT HOME, OR GET ALONG WITH OTHER PEOPLE: SOMEWHAT DIFFICULT
2. NOT BEING ABLE TO STOP OR CONTROL WORRYING: SEVERAL DAYS
1. FEELING NERVOUS, ANXIOUS, OR ON EDGE: SEVERAL DAYS

## 2023-03-14 ASSESSMENT — ENCOUNTER SYMPTOMS
BREAST MASS: 0
NERVOUS/ANXIOUS: 1

## 2023-03-14 NOTE — ASSESSMENT & PLAN NOTE
Works well for patient. Denies any concerns regarding side effects or issues taking medication. Does not desire pregnancy. Normal menstrual cycles after her IUD was removed. Reviewed contraindications for hormonal birth control, including migraine with aura, prolonged immobility, use of tobacco and uncontrolled hypertension, none of which are applicable to patient. Refill provided today.

## 2023-03-14 NOTE — ASSESSMENT & PLAN NOTE
Reports this was a significant problem with her paragard IUD in place. Symptoms are much better managed on COCs.

## 2023-03-14 NOTE — PROGRESS NOTES
SUBJECTIVE:   CC: Greg is an 38 year old who presents for preventive health visit.   Patient has been advised of split billing requirements and indicates understanding: Yes  Healthy Habits:     Getting at least 3 servings of Calcium per day:  Yes    Bi-annual eye exam:  NO    Dental care twice a year:  NO    Sleep apnea or symptoms of sleep apnea:  None    Diet:  Regular (no restrictions)    Frequency of exercise:  2-3 days/week    Duration of exercise:  15-30 minutes    Taking medications regularly:  Yes    Medication side effects:  None    PHQ-2 Total Score: 0    Additional concerns today:  No    Today's PHQ-2 Score:   PHQ-2 ( 1999 [employer]) 3/14/2023   Q1: Little interest or pleasure in doing things 0   Q2: Feeling down, depressed or hopeless 0   PHQ-2 Score 0   Q1: Little interest or pleasure in doing things Not at all   Q2: Feeling down, depressed or hopeless Not at all   PHQ-2 Score 0       Have you ever done Advance Care Planning? (For example, a Health Directive, POLST, or a discussion with a medical provider or your loved ones about your wishes): No, advance care planning information given to patient to review.  Patient declined advance care planning discussion at this time.    Social History     Tobacco Use     Smoking status: Never     Smokeless tobacco: Never   Substance Use Topics     Alcohol use: Yes     Alcohol/week: 0.0 standard drinks     Comment: occasional      Alcohol Use 3/14/2023   Prescreen: >3 drinks/day or >7 drinks/week? Yes   AUDIT SCORE  4     Reviewed orders with patient.  Reviewed health maintenance and updated orders accordingly - Yes  Lab work is in process  Labs reviewed in EPIC  BP Readings from Last 3 Encounters:   03/14/23 138/86   11/27/17 114/79   10/19/16 114/77    Wt Readings from Last 3 Encounters:   03/14/23 65.5 kg (144 lb 4.8 oz)   11/27/17 62.6 kg (137 lb 14.4 oz)   10/19/16 60.1 kg (132 lb 9.6 oz)                  Patient Active Problem List   Diagnosis      Generalized anxiety disorder     PMDD (premenstrual dysphoric disorder)     Routine general medical examination at a health care facility     Encounter for prescription of oral contraceptives     Past Surgical History:   Procedure Laterality Date     breast lift/implants  09/2014       Social History     Tobacco Use     Smoking status: Never     Smokeless tobacco: Never   Substance Use Topics     Alcohol use: Yes     Alcohol/week: 0.0 standard drinks     Comment: occasional      Family History   Problem Relation Age of Onset     Osteoporosis Mother      Depression Father      Skin Cancer Father      Depression Brother      Colon Cancer Maternal Grandmother      Cancer Maternal Grandfather      Cerebrovascular Disease Maternal Grandfather      Cancer Paternal Grandmother      Colon Cancer Paternal Grandmother      Breast Cancer No family hx of      Coronary Artery Disease Early Onset No family hx of      Diabetes No family hx of      Thyroid Disease No family hx of          Current Outpatient Medications   Medication Sig Dispense Refill     buPROPion (WELLBUTRIN XL) 150 MG 24 hr tablet Take 1 tablet (150 mg) by mouth every morning 90 tablet 3     drospirenone-ethinyl estradiol (EVANGELISTA) 3-0.02 MG tablet Take 1 tablet by mouth daily for 90 days 84 tablet 3     sertraline (ZOLOFT) 100 MG tablet Take 1 tablet (100 mg) by mouth daily 90 tablet 3     sertraline (ZOLOFT) 100 MG tablet Take 1 tablet (100 mg) by mouth daily - for one week out of the month, take 150mg ( PMDD). Due for follow up in clinic 135 tablet 0     buPROPion (WELLBUTRIN XL) 150 MG 24 hr tablet TAKE 1 TABLET BY MOUTH EVERY MORNING (Patient not taking: Reported on 3/14/2023) 7 tablet 0     hydrOXYzine (ATARAX) 10 MG tablet Take 1-3 tablets (10-30 mg) by mouth 3 times daily as needed for itching or anxiety (Patient not taking: Reported on 11/27/2017) 60 tablet 1     paragard intrauterine copper 1 each by Intrauterine route once (Patient not taking: Reported on  "2/8/2023) 1 each      sertraline (ZOLOFT) 100 MG tablet TAKE 1& 1/2 TABLETS BY MOUTH EVERY DAY (Patient not taking: Reported on 3/14/2023) 11 tablet 0       Breast Cancer Screening:    FHS-7:   Breast CA Risk Assessment (FHS-7) 3/14/2023   Did any of your first-degree relatives have breast or ovarian cancer? No   Did any of your relatives have bilateral breast cancer? No   Did any woman in your family have breast and ovarian cancer? No   Did any woman in your family have breast cancer before age 50 y? No   Do you have 2 or more relatives with breast and/or ovarian cancer? No   Do you have 2 or more relatives with breast and/or bowel cancer? No       Mammogram Screening - Offered annual screening and updated Health Maintenance for mutual plan based on risk factor consideration    Pertinent mammograms are reviewed under the imaging tab.    History of abnormal Pap smear: NO - age 30-65 PAP every 5 years with negative HPV co-testing recommended  PAP / HPV Latest Ref Rng & Units 11/27/2017   PAP (Historical) - NIL   HPV16 NEG:Negative Negative   HPV18 NEG:Negative Negative   HRHPV NEG:Negative Negative     Reviewed and updated as needed this visit by clinical staff   Tobacco  Allergies  Meds              Reviewed and updated as needed this visit by Provider                 Review of Systems   Breasts:  Negative for tenderness, breast mass and discharge.   Genitourinary: Negative for pelvic pain, vaginal bleeding and vaginal discharge.   Psychiatric/Behavioral: Positive for mood changes. The patient is nervous/anxious.        OBJECTIVE:   /86 (BP Location: Left arm, Patient Position: Sitting, Cuff Size: Adult Regular)   Pulse 68   Temp 97.9  F (36.6  C) (Oral)   Resp 16   Ht 1.753 m (5' 9\")   Wt 65.5 kg (144 lb 4.8 oz)   LMP 03/05/2023 (Approximate)   SpO2 100%   BMI 21.31 kg/m       Physical Exam  GENERAL: healthy, alert and no distress  EYES: Eyes grossly normal to inspection, PERRL and conjunctivae and " sclerae normal  HENT: ear canals and TM's normal, nose and mouth without ulcers or lesions  NECK: no adenopathy, no asymmetry, masses, or scars and thyroid normal to palpation  RESP: lungs clear to auscultation - no rales, rhonchi or wheezes  BREAST: declined by patient. Discussed breast awareness.  CV: regular rate and rhythm, normal S1 S2, no S3 or S4, no murmur, click or rub, no peripheral edema and peripheral pulses strong  ABDOMEN: soft, nontender, no hepatosplenomegaly, no masses and bowel sounds normal  MS: no gross musculoskeletal defects noted, no edema  SKIN: no suspicious lesions or rashes  NEURO: Normal strength and tone, mentation intact and speech normal  PSYCH: mentation appears normal, affect normal/bright      ASSESSMENT/PLAN:     Problem List Items Addressed This Visit        Behavioral    Generalized anxiety disorder     Patient reports good control of symptoms. PHQ-2 was 0. BLAZE-7 was 4. Wellbutrin 150mg and escitalopram 100mg work well. Refills provided today. Plan to reassess in one year or sooner with any changes.          Relevant Medications    sertraline (ZOLOFT) 100 MG tablet    buPROPion (WELLBUTRIN XL) 150 MG 24 hr tablet    PMDD (premenstrual dysphoric disorder)     Reports this was a significant problem with her paragard IUD in place. Symptoms are much better managed on COCs.          Relevant Medications    sertraline (ZOLOFT) 100 MG tablet    buPROPion (WELLBUTRIN XL) 150 MG 24 hr tablet       Other    Routine general medical examination at a health care facility - Primary     Annual exam.  Updated Pap today.  Patient has elected to start breast cancer screening at age 38, so order for mammogram was placed. Does have family history of colon cancer, but diagnosed later in life. Discussed healthy diet and exercise recommendations; patient's BMI is 21. Patient reports she had recent lab work in the past 1-2 years without any abnormalities; she has filled out an OSMIN for these. Would  consider lipids, CBC, CMP next year based on these results but decision was made not to repeat today.         Encounter for prescription of oral contraceptives     Works well for patient. Denies any concerns regarding side effects or issues taking medication. Does not desire pregnancy. Normal menstrual cycles after her IUD was removed. Reviewed contraindications for hormonal birth control, including migraine with aura, prolonged immobility, use of tobacco and uncontrolled hypertension, none of which are applicable to patient. Refill provided today.          Relevant Medications    drospirenone-ethinyl estradiol (EVANGELISTA) 3-0.02 MG tablet   Other Visit Diagnoses     Encounter for screening mammogram for breast cancer        Relevant Orders    MA Screen Bilateral w/Ashok    Cervical cancer screening        Relevant Orders    Pap Screen with HPV - recommended age 30 - 65 years    Screening for thyroid disorder        Relevant Orders    TSH with free T4 reflex          Patient has been advised of split billing requirements and indicates understanding: Yes      COUNSELING:  Reviewed preventive health counseling, as reflected in patient instructions       Regular exercise       Healthy diet/nutrition       Immunizations    Vaccinated for: Covid-19 and TDAP         Osteoporosis prevention/bone health       Consider Hep C screening for all patients one time for ages 18-79 years       HIV screeninx in teen years, 1x in adult years, and at intervals if high risk    She reports that she has never smoked. She has never used smokeless tobacco.    TOM Franco CNP  [facility]

## 2023-03-14 NOTE — ASSESSMENT & PLAN NOTE
Annual exam.  Updated Pap today.  Patient has elected to start breast cancer screening at age 38, so order for mammogram was placed. Does have family history of colon cancer, but diagnosed later in life. Discussed healthy diet and exercise recommendations; patient's BMI is 21. Patient reports she had recent lab work in the past 1-2 years without any abnormalities; she has filled out an OSMIN for these. Would consider lipids, CBC, CMP next year based on these results but decision was made not to repeat today.

## 2023-03-14 NOTE — ASSESSMENT & PLAN NOTE
Patient reports good control of symptoms. PHQ-2 was 0. BLAZE-7 was 4. Wellbutrin 150mg and escitalopram 100mg work well. Refills provided today. Plan to reassess in one year or sooner with any changes.

## 2023-03-16 ENCOUNTER — MYC MEDICAL ADVICE (OUTPATIENT)
Dept: FAMILY MEDICINE | Facility: CLINIC | Age: 38
End: 2023-03-16
Payer: COMMERCIAL

## 2023-03-16 DIAGNOSIS — E03.9 HYPOTHYROIDISM, UNSPECIFIED TYPE: Primary | ICD-10-CM

## 2023-03-16 RX ORDER — LEVOTHYROXINE SODIUM 25 UG/1
25 TABLET ORAL DAILY
Qty: 90 TABLET | Refills: 0 | Status: SHIPPED | OUTPATIENT
Start: 2023-03-16 | End: 2023-06-20

## 2023-03-17 LAB
BKR LAB AP GYN ADEQUACY: NORMAL
BKR LAB AP GYN INTERPRETATION: NORMAL
BKR LAB AP HPV REFLEX: NORMAL
BKR LAB AP LMP: NORMAL
BKR LAB AP PREVIOUS ABNORMAL: NORMAL
PATH REPORT.COMMENTS IMP SPEC: NORMAL
PATH REPORT.COMMENTS IMP SPEC: NORMAL
PATH REPORT.RELEVANT HX SPEC: NORMAL

## 2023-03-21 LAB
HUMAN PAPILLOMA VIRUS 16 DNA: NEGATIVE
HUMAN PAPILLOMA VIRUS 18 DNA: NEGATIVE
HUMAN PAPILLOMA VIRUS FINAL DIAGNOSIS: NORMAL
HUMAN PAPILLOMA VIRUS OTHER HR: NEGATIVE

## 2023-04-07 ENCOUNTER — ANCILLARY PROCEDURE (OUTPATIENT)
Dept: MAMMOGRAPHY | Facility: CLINIC | Age: 38
End: 2023-04-07
Payer: COMMERCIAL

## 2023-04-07 DIAGNOSIS — Z12.31 ENCOUNTER FOR SCREENING MAMMOGRAM FOR BREAST CANCER: ICD-10-CM

## 2023-04-07 PROCEDURE — 77067 SCR MAMMO BI INCL CAD: CPT

## 2023-05-31 ENCOUNTER — MYC REFILL (OUTPATIENT)
Dept: FAMILY MEDICINE | Facility: CLINIC | Age: 38
End: 2023-05-31
Payer: COMMERCIAL

## 2023-05-31 DIAGNOSIS — F41.1 GAD (GENERALIZED ANXIETY DISORDER): ICD-10-CM

## 2023-05-31 RX ORDER — BUPROPION HYDROCHLORIDE 150 MG/1
150 TABLET ORAL EVERY MORNING
Qty: 7 TABLET | Refills: 0 | Status: SHIPPED | OUTPATIENT
Start: 2023-05-31 | End: 2024-04-29

## 2023-05-31 ASSESSMENT — ANXIETY QUESTIONNAIRES
GAD7 TOTAL SCORE: 7
7. FEELING AFRAID AS IF SOMETHING AWFUL MIGHT HAPPEN: NOT AT ALL
1. FEELING NERVOUS, ANXIOUS, OR ON EDGE: MORE THAN HALF THE DAYS
IF YOU CHECKED OFF ANY PROBLEMS ON THIS QUESTIONNAIRE, HOW DIFFICULT HAVE THESE PROBLEMS MADE IT FOR YOU TO DO YOUR WORK, TAKE CARE OF THINGS AT HOME, OR GET ALONG WITH OTHER PEOPLE: NOT DIFFICULT AT ALL
8. IF YOU CHECKED OFF ANY PROBLEMS, HOW DIFFICULT HAVE THESE MADE IT FOR YOU TO DO YOUR WORK, TAKE CARE OF THINGS AT HOME, OR GET ALONG WITH OTHER PEOPLE?: NOT DIFFICULT AT ALL
4. TROUBLE RELAXING: NOT AT ALL
2. NOT BEING ABLE TO STOP OR CONTROL WORRYING: MORE THAN HALF THE DAYS
6. BECOMING EASILY ANNOYED OR IRRITABLE: SEVERAL DAYS
3. WORRYING TOO MUCH ABOUT DIFFERENT THINGS: MORE THAN HALF THE DAYS
5. BEING SO RESTLESS THAT IT IS HARD TO SIT STILL: NOT AT ALL
7. FEELING AFRAID AS IF SOMETHING AWFUL MIGHT HAPPEN: NOT AT ALL
GAD7 TOTAL SCORE: 7

## 2023-05-31 ASSESSMENT — PATIENT HEALTH QUESTIONNAIRE - PHQ9
SUM OF ALL RESPONSES TO PHQ QUESTIONS 1-9: 5
SUM OF ALL RESPONSES TO PHQ QUESTIONS 1-9: 5
10. IF YOU CHECKED OFF ANY PROBLEMS, HOW DIFFICULT HAVE THESE PROBLEMS MADE IT FOR YOU TO DO YOUR WORK, TAKE CARE OF THINGS AT HOME, OR GET ALONG WITH OTHER PEOPLE: NOT DIFFICULT AT ALL

## 2023-05-31 NOTE — TELEPHONE ENCOUNTER
Routing refill request to provider for review/approval because:  Failed selective serotonin reuptake inhibitor protocol.  PHQ 9 and BLAZE 7 sent to Patient to fill out and return.  Octavio Chowdhury RN

## 2023-06-01 ENCOUNTER — MYC MEDICAL ADVICE (OUTPATIENT)
Dept: FAMILY MEDICINE | Facility: CLINIC | Age: 38
End: 2023-06-01
Payer: COMMERCIAL

## 2023-06-01 DIAGNOSIS — Z30.011 ENCOUNTER FOR PRESCRIPTION OF ORAL CONTRACEPTIVES: ICD-10-CM

## 2023-06-01 RX ORDER — DROSPIRENONE AND ETHINYL ESTRADIOL 0.02-3(28)
1 KIT ORAL DAILY
Qty: 84 TABLET | Refills: 3 | Status: SHIPPED | OUTPATIENT
Start: 2023-06-01 | End: 2024-06-13

## 2023-06-01 NOTE — TELEPHONE ENCOUNTER
"Prescription approved per [facility] Refill Protocol.    Requested Prescriptions   Pending Prescriptions Disp Refills     drospirenone-ethinyl estradiol (EVANGELISTA) 3-0.02 MG tablet 84 tablet 3     Sig: Take 1 tablet by mouth daily       Contraceptives Protocol Passed - 6/1/2023  3:29 PM        Passed - Patient is not a current smoker if age is 35 or older        Passed - Recent (12 mo) or future (30 days) visit within the authorizing provider's specialty     Patient has had an office visit with the authorizing provider or a provider within the authorizing providers department within the previous 12 mos or has a future within next 30 days. See \"Patient Info\" tab in inbasket, or \"Choose Columns\" in Meds & Orders section of the refill encounter.              Passed - Medication is active on med list        Passed - No active pregnancy on record        Passed - No positive pregnancy test in past 12 months             "

## 2023-06-01 NOTE — TELEPHONE ENCOUNTER
Prior to most recent fill on 5/31/23, Bupropion filled for year supply on 3/14/23. Instructed patient to contact pharmacy.     Madie refill prepped below, awaiting to hear back from patient re which pharmacy to send it to

## 2023-06-01 NOTE — TELEPHONE ENCOUNTER
90 days with 3 refills of both of these medications were sent to pharmacy in March. Will verify with patient if she needs them sent elsewhere.

## 2023-06-19 DIAGNOSIS — E03.9 HYPOTHYROIDISM, UNSPECIFIED TYPE: ICD-10-CM

## 2023-06-19 NOTE — TELEPHONE ENCOUNTER
Medication Request  Medication name: levothyroxine (SYNTHROID/LEVOTHROID) 25 MCG tablet  Requested Pharmacy: [facility]  When was patient last seen for this?: Last OV: 03/14/2023  Patient offered appointment: N/A  Okay to leave a detailed message: no

## 2023-06-20 RX ORDER — LEVOTHYROXINE SODIUM 25 UG/1
25 TABLET ORAL DAILY
Qty: 30 TABLET | Refills: 0 | Status: SHIPPED | OUTPATIENT
Start: 2023-06-20

## 2023-06-20 NOTE — TELEPHONE ENCOUNTER
"Routing refill request to provider for review/approval because:  Labs out of range:  TSH    Last Written Prescription Date:  3/16/2023  Last Fill Quantity: 90,  # refills: 0   Last office visit provider:  3/14/2023     Requested Prescriptions   Pending Prescriptions Disp Refills     levothyroxine (SYNTHROID/LEVOTHROID) 25 MCG tablet 90 tablet 0     Sig: Take 1 tablet (25 mcg) by mouth daily       Thyroid Protocol Failed - 6/19/2023 11:07 AM        Failed - Normal TSH on file in past 12 months     Recent Labs   Lab Test 03/14/23  1034   TSH 5.28*              Passed - Patient is 12 years or older        Passed - Recent (12 mo) or future (30 days) visit within the authorizing provider's specialty     Patient has had an office visit with the authorizing provider or a provider within the authorizing providers department within the previous 12 mos or has a future within next 30 days. See \"Patient Info\" tab in inbasket, or \"Choose Columns\" in Meds & Orders section of the refill encounter.              Passed - Medication is active on med list        Passed - No active pregnancy on record     If patient is pregnant or has had a positive pregnancy test, please check TSH.          Passed - No positive pregnancy test in past 12 months     If patient is pregnant or has had a positive pregnancy test, please check TSH.               Patricia Cook RN 06/19/23 11:42 PM  "

## 2024-03-20 DIAGNOSIS — F41.1 GAD (GENERALIZED ANXIETY DISORDER): ICD-10-CM

## 2024-03-20 DIAGNOSIS — F41.1 GENERALIZED ANXIETY DISORDER: ICD-10-CM

## 2024-03-20 RX ORDER — SERTRALINE HYDROCHLORIDE 100 MG/1
100 TABLET, FILM COATED ORAL DAILY
Qty: 90 TABLET | Refills: 0 | Status: SHIPPED | OUTPATIENT
Start: 2024-03-20

## 2024-03-20 NOTE — TELEPHONE ENCOUNTER
Medication Request    Medication name:   sertraline (ZOLOFT) 100 MG tablet   Sig - Route: Take 1 tablet (100 mg) by mouth daily - Oral     Requested Pharmacy: [facility] ([id number])    When was patient last seen?:  03/14/23    Patient offered appointment:  KENNETH, Pharmacy Requested.    Okay to leave a detailed message: no

## 2024-04-28 ENCOUNTER — TELEPHONE (OUTPATIENT)
Dept: FAMILY MEDICINE | Facility: CLINIC | Age: 39
End: 2024-04-28
Payer: COMMERCIAL

## 2024-04-28 DIAGNOSIS — F41.1 GAD (GENERALIZED ANXIETY DISORDER): ICD-10-CM

## 2024-04-29 RX ORDER — BUPROPION HYDROCHLORIDE 150 MG/1
150 TABLET ORAL EVERY MORNING
Qty: 30 TABLET | Refills: 1 | Status: SHIPPED | OUTPATIENT
Start: 2024-04-29

## 2024-04-29 NOTE — TELEPHONE ENCOUNTER
Left message to call back for: patient x1  Information to relay to patient: See provider message below & schedule.

## 2024-05-11 ENCOUNTER — HEALTH MAINTENANCE LETTER (OUTPATIENT)
Age: 39
End: 2024-05-11

## 2024-06-13 ENCOUNTER — MYC REFILL (OUTPATIENT)
Dept: FAMILY MEDICINE | Facility: CLINIC | Age: 39
End: 2024-06-13
Payer: COMMERCIAL

## 2024-06-13 DIAGNOSIS — Z30.011 ENCOUNTER FOR PRESCRIPTION OF ORAL CONTRACEPTIVES: ICD-10-CM

## 2024-06-13 RX ORDER — DROSPIRENONE AND ETHINYL ESTRADIOL 0.02-3(28)
1 KIT ORAL DAILY
Qty: 84 TABLET | Refills: 0 | Status: SHIPPED | OUTPATIENT
Start: 2024-06-13

## 2024-06-13 NOTE — TELEPHONE ENCOUNTER
Left message to call back for: patient x1  Information to relay to patient: See below & schedule     Mychart reply sent as its the original communication method.

## 2025-05-17 ENCOUNTER — HEALTH MAINTENANCE LETTER (OUTPATIENT)
Age: 40
End: 2025-05-17

## 2025-06-07 ENCOUNTER — HEALTH MAINTENANCE LETTER (OUTPATIENT)
Age: 40
End: 2025-06-07